# Patient Record
Sex: MALE | Race: WHITE | NOT HISPANIC OR LATINO | Employment: OTHER | URBAN - METROPOLITAN AREA
[De-identification: names, ages, dates, MRNs, and addresses within clinical notes are randomized per-mention and may not be internally consistent; named-entity substitution may affect disease eponyms.]

---

## 2018-01-10 ENCOUNTER — OFFICE VISIT CONVERTED (OUTPATIENT)
Dept: CARDIOLOGY | Facility: CLINIC | Age: 53
End: 2018-01-10
Attending: INTERNAL MEDICINE

## 2018-07-11 ENCOUNTER — OFFICE VISIT CONVERTED (OUTPATIENT)
Dept: CARDIOLOGY | Facility: CLINIC | Age: 53
End: 2018-07-11
Attending: INTERNAL MEDICINE

## 2019-01-16 ENCOUNTER — OFFICE VISIT CONVERTED (OUTPATIENT)
Dept: CARDIOLOGY | Facility: CLINIC | Age: 54
End: 2019-01-16
Attending: INTERNAL MEDICINE

## 2019-02-14 ENCOUNTER — HOSPITAL ENCOUNTER (OUTPATIENT)
Dept: OTHER | Facility: HOSPITAL | Age: 54
Discharge: HOME | End: 2019-02-14
Attending: INTERNAL MEDICINE

## 2019-02-14 LAB
ALBUMIN SERPL-MCNC: 3.8 G/DL (ref 3.5–5)
ALBUMIN/GLOB SERPL: 1.1 {RATIO} (ref 1.4–2.6)
ALP SERPL-CCNC: 114 U/L (ref 56–119)
ALT SERPL-CCNC: 37 U/L (ref 10–40)
ANION GAP SERPL CALC-SCNC: 17 MMOL/L (ref 8–19)
AST SERPL-CCNC: 29 U/L (ref 15–50)
BILIRUB SERPL-MCNC: 0.5 MG/DL (ref 0.2–1.3)
BNP SERPL-MCNC: 64 PG/ML (ref 0–900)
BUN SERPL-MCNC: 15 MG/DL (ref 5–25)
BUN/CREAT SERPL: 13 {RATIO} (ref 6–20)
CALCIUM SERPL-MCNC: 9.1 MG/DL (ref 8.7–10.4)
CHLORIDE SERPL-SCNC: 99 MMOL/L (ref 99–111)
CHOLEST SERPL-MCNC: 162 MG/DL (ref 107–200)
CHOLEST/HDLC SERPL: 4 {RATIO} (ref 3–6)
CONV CO2: 29 MMOL/L (ref 22–32)
CONV TOTAL PROTEIN: 7.4 G/DL (ref 6.3–8.2)
CREAT UR-MCNC: 1.14 MG/DL (ref 0.7–1.2)
GFR SERPLBLD BASED ON 1.73 SQ M-ARVRAT: >60 ML/MIN/{1.73_M2}
GLOBULIN UR ELPH-MCNC: 3.6 G/DL (ref 2–3.5)
GLUCOSE SERPL-MCNC: 118 MG/DL (ref 70–99)
HDLC SERPL-MCNC: 41 MG/DL (ref 40–60)
LDLC SERPL CALC-MCNC: 100 MG/DL (ref 70–100)
OSMOLALITY SERPL CALC.SUM OF ELEC: 294 MOSM/KG (ref 273–304)
POTASSIUM SERPL-SCNC: 3.9 MMOL/L (ref 3.5–5.3)
SODIUM SERPL-SCNC: 141 MMOL/L (ref 135–147)
TRIGL SERPL-MCNC: 104 MG/DL (ref 40–150)
VLDLC SERPL-MCNC: 21 MG/DL (ref 5–37)

## 2019-07-17 ENCOUNTER — OFFICE VISIT CONVERTED (OUTPATIENT)
Dept: CARDIOLOGY | Facility: CLINIC | Age: 54
End: 2019-07-17
Attending: NURSE PRACTITIONER

## 2019-07-17 ENCOUNTER — CONVERSION ENCOUNTER (OUTPATIENT)
Dept: CARDIOLOGY | Facility: CLINIC | Age: 54
End: 2019-07-17

## 2019-08-01 ENCOUNTER — CONVERSION ENCOUNTER (OUTPATIENT)
Dept: CARDIOLOGY | Facility: CLINIC | Age: 54
End: 2019-08-01
Attending: INTERNAL MEDICINE

## 2020-01-20 ENCOUNTER — OFFICE VISIT CONVERTED (OUTPATIENT)
Dept: CARDIOLOGY | Facility: CLINIC | Age: 55
End: 2020-01-20
Attending: INTERNAL MEDICINE

## 2020-01-22 ENCOUNTER — HOSPITAL ENCOUNTER (OUTPATIENT)
Dept: OTHER | Facility: HOSPITAL | Age: 55
Discharge: HOME | End: 2020-01-22
Attending: NURSE PRACTITIONER

## 2020-01-22 LAB
ALBUMIN SERPL-MCNC: 3.8 G/DL (ref 3.5–5)
ALBUMIN/GLOB SERPL: 1.1 {RATIO} (ref 1.4–2.6)
ALP SERPL-CCNC: 110 U/L (ref 56–119)
ALT SERPL-CCNC: 48 U/L (ref 10–40)
ANION GAP SERPL CALC-SCNC: 16 MMOL/L (ref 8–19)
AST SERPL-CCNC: 56 U/L (ref 15–50)
BILIRUB SERPL-MCNC: 0.56 MG/DL (ref 0.2–1.3)
BNP SERPL-MCNC: 90 PG/ML (ref 0–900)
BUN SERPL-MCNC: 10 MG/DL (ref 5–25)
BUN/CREAT SERPL: 10 {RATIO} (ref 6–20)
CALCIUM SERPL-MCNC: 9 MG/DL (ref 8.7–10.4)
CHLORIDE SERPL-SCNC: 101 MMOL/L (ref 99–111)
CHOLEST SERPL-MCNC: 134 MG/DL (ref 107–200)
CHOLEST/HDLC SERPL: 4.5 {RATIO} (ref 3–6)
CONV CO2: 26 MMOL/L (ref 22–32)
CONV TOTAL PROTEIN: 7.4 G/DL (ref 6.3–8.2)
CREAT UR-MCNC: 0.96 MG/DL (ref 0.7–1.2)
GFR SERPLBLD BASED ON 1.73 SQ M-ARVRAT: >60 ML/MIN/{1.73_M2}
GLOBULIN UR ELPH-MCNC: 3.6 G/DL (ref 2–3.5)
GLUCOSE SERPL-MCNC: 155 MG/DL (ref 70–99)
HDLC SERPL-MCNC: 30 MG/DL (ref 40–60)
LDLC SERPL CALC-MCNC: 80 MG/DL (ref 70–100)
OSMOLALITY SERPL CALC.SUM OF ELEC: 288 MOSM/KG (ref 273–304)
POTASSIUM SERPL-SCNC: 4.6 MMOL/L (ref 3.5–5.3)
SODIUM SERPL-SCNC: 138 MMOL/L (ref 135–147)
TRIGL SERPL-MCNC: 121 MG/DL (ref 40–150)
VLDLC SERPL-MCNC: 24 MG/DL (ref 5–37)

## 2020-08-05 ENCOUNTER — TELEPHONE CONVERTED (OUTPATIENT)
Dept: CARDIOLOGY | Facility: CLINIC | Age: 55
End: 2020-08-05
Attending: NURSE PRACTITIONER

## 2020-08-18 ENCOUNTER — HOSPITAL ENCOUNTER (OUTPATIENT)
Dept: OTHER | Facility: HOSPITAL | Age: 55
Discharge: HOME | End: 2020-08-18
Attending: NURSE PRACTITIONER

## 2020-08-18 LAB
ALBUMIN SERPL-MCNC: 4.2 G/DL (ref 3.5–5)
ALBUMIN/GLOB SERPL: 1.1 {RATIO} (ref 1.4–2.6)
ALP SERPL-CCNC: 115 U/L (ref 56–119)
ALT SERPL-CCNC: 41 U/L (ref 10–40)
ANION GAP SERPL CALC-SCNC: 17 MMOL/L (ref 8–19)
AST SERPL-CCNC: 37 U/L (ref 15–50)
BILIRUB SERPL-MCNC: 0.66 MG/DL (ref 0.2–1.3)
BUN SERPL-MCNC: 12 MG/DL (ref 5–25)
BUN/CREAT SERPL: 12 {RATIO} (ref 6–20)
CALCIUM SERPL-MCNC: 10 MG/DL (ref 8.7–10.4)
CHLORIDE SERPL-SCNC: 101 MMOL/L (ref 99–111)
CHOLEST SERPL-MCNC: 108 MG/DL (ref 107–200)
CHOLEST/HDLC SERPL: 3.4 {RATIO} (ref 3–6)
CONV CO2: 27 MMOL/L (ref 22–32)
CONV TOTAL PROTEIN: 7.9 G/DL (ref 6.3–8.2)
CREAT UR-MCNC: 1.04 MG/DL (ref 0.7–1.2)
GFR SERPLBLD BASED ON 1.73 SQ M-ARVRAT: >60 ML/MIN/{1.73_M2}
GLOBULIN UR ELPH-MCNC: 3.7 G/DL (ref 2–3.5)
GLUCOSE SERPL-MCNC: 150 MG/DL (ref 70–99)
HDLC SERPL-MCNC: 32 MG/DL (ref 40–60)
LDLC SERPL CALC-MCNC: 52 MG/DL (ref 70–100)
OSMOLALITY SERPL CALC.SUM OF ELEC: 295 MOSM/KG (ref 273–304)
POTASSIUM SERPL-SCNC: 4.4 MMOL/L (ref 3.5–5.3)
SODIUM SERPL-SCNC: 141 MMOL/L (ref 135–147)
TRIGL SERPL-MCNC: 119 MG/DL (ref 40–150)
VLDLC SERPL-MCNC: 24 MG/DL (ref 5–37)

## 2021-02-08 ENCOUNTER — HOSPITAL ENCOUNTER (OUTPATIENT)
Dept: URGENT CARE | Facility: CLINIC | Age: 56
Discharge: HOME | End: 2021-02-08
Attending: NURSE PRACTITIONER

## 2021-02-22 ENCOUNTER — HOSPITAL ENCOUNTER (OUTPATIENT)
Dept: OTHER | Facility: HOSPITAL | Age: 56
Discharge: HOME | End: 2021-02-22
Attending: NURSE PRACTITIONER

## 2021-02-22 LAB
ALBUMIN SERPL-MCNC: 3.8 G/DL (ref 3.5–5)
ALBUMIN/GLOB SERPL: 1.1 {RATIO} (ref 1.4–2.6)
ALP SERPL-CCNC: 189 U/L (ref 56–119)
ALT SERPL-CCNC: 36 U/L (ref 10–40)
ANION GAP SERPL CALC-SCNC: 15 MMOL/L (ref 8–19)
AST SERPL-CCNC: 27 U/L (ref 15–50)
BILIRUB SERPL-MCNC: 0.61 MG/DL (ref 0.2–1.3)
BUN SERPL-MCNC: 17 MG/DL (ref 5–25)
BUN/CREAT SERPL: 15 {RATIO} (ref 6–20)
CALCIUM SERPL-MCNC: 8.7 MG/DL (ref 8.7–10.4)
CHLORIDE SERPL-SCNC: 98 MMOL/L (ref 99–111)
CHOLEST SERPL-MCNC: 125 MG/DL (ref 107–200)
CHOLEST/HDLC SERPL: 3.9 {RATIO} (ref 3–6)
CONV CO2: 27 MMOL/L (ref 22–32)
CONV TOTAL PROTEIN: 7.2 G/DL (ref 6.3–8.2)
CREAT UR-MCNC: 1.13 MG/DL (ref 0.7–1.2)
GFR SERPLBLD BASED ON 1.73 SQ M-ARVRAT: >60 ML/MIN/{1.73_M2}
GLOBULIN UR ELPH-MCNC: 3.4 G/DL (ref 2–3.5)
GLUCOSE SERPL-MCNC: 361 MG/DL (ref 70–99)
HDLC SERPL-MCNC: 32 MG/DL (ref 40–60)
LDLC SERPL CALC-MCNC: 43 MG/DL (ref 70–100)
OSMOLALITY SERPL CALC.SUM OF ELEC: 298 MOSM/KG (ref 273–304)
POTASSIUM SERPL-SCNC: 4.2 MMOL/L (ref 3.5–5.3)
SODIUM SERPL-SCNC: 136 MMOL/L (ref 135–147)
TRIGL SERPL-MCNC: 250 MG/DL (ref 40–150)
VLDLC SERPL-MCNC: 50 MG/DL (ref 5–37)

## 2021-02-24 ENCOUNTER — HOSPITAL ENCOUNTER (OUTPATIENT)
Dept: URGENT CARE | Facility: CLINIC | Age: 56
Discharge: HOME | End: 2021-02-24
Attending: FAMILY MEDICINE

## 2021-02-24 LAB — GLUCOSE BLD-MCNC: 383 MG/DL (ref 70–99)

## 2021-04-14 ENCOUNTER — OFFICE VISIT CONVERTED (OUTPATIENT)
Dept: CARDIOLOGY | Facility: CLINIC | Age: 56
End: 2021-04-14
Attending: NURSE PRACTITIONER

## 2021-05-10 ENCOUNTER — HOSPITAL ENCOUNTER (OUTPATIENT)
Dept: URGENT CARE | Facility: CLINIC | Age: 56
Discharge: HOME | End: 2021-05-10
Attending: NURSE PRACTITIONER

## 2021-05-13 NOTE — PROGRESS NOTES
Progress Note      Patient Name: Harshil Carpenter   Patient ID: [id number]   Sex: Male   YOB: 1965    Primary Care Provider: Alon Fletcher MD   Referring Provider: Alon Fletcher MD    Visit Date: August 5, 2020    Provider: LISSET Pierce   Location: [facility]   Location Address: [address] [place] [address]   [place]  [id number]   Location Phone: [phone]          History Of Present Illness  TELEHEALTH TELEPHONE VISIT  Chief Complaint: Follow up on coronary artery disease.   Harshil Carpenter is a 54 year old /White male who states his shortness of breath is about the same. He has occasional swelling, but denies any chest pain. No palpitations, dizziness, syncope, PND, or orthopnea. He states he never started the Zetia, but he does continue to take the rosuvastatin. He did not do any labs recently and somewhere along the way he stopped taking his carvedilol because he thought he did not have to take it anymore. He is presenting for evaluation via telehealth telephone visit. Verbal consent obtained before beginning visit.   Provider spent 10 minutes with the patient during the telehealth visit.   The following staff were present during this visit: Provider only.   PAST MEDICAL HISTORY: Coronary artery disease with previous myocardial infarction and stent/PCI in 2016; Hyperlipidemia; Hypertension; Morbid obesity; Nicotine dependence.   FAMILY HISTORY: Positive for diabetes mellitus. Negative for hypertension or heart disease.   PSYCHOSOCIAL HISTORY: Previously smoked, but quit 2-3years ago. Denies alcohol use.   CURRENT MEDICATIONS: Aspirin 81 mg daily; torsemide 20 mg b.i.d.; gabapentin 800 mg t.i.d.; Percocet t.i.d. p.r.n.; rosuvastatin 40 mg daily.       Review of Systems  · Cardiovascular  o Admits  o : swelling (feet, ankles, hands), shortness of breath while walking or lying flat  o Denies  o : palpitations (fast, fluttering, or skipping beats),  chest pain or angina pectoris   · Respiratory  o Denies  o : chronic or frequent cough, asthma or wheezing      Vitals     Patient unable to obtain vitals.       Physical Examination  · Labs  o Labs  o : Not done recently.           Assessment     1.  Coronary artery disease with previous MI and stent without angina.  2.  Hyperlipidemia, unknown control.  3.  Hypertension, unknown control.  4.  Nicotine dependence.       Plan     1.  He states he has not smoked for a long time, so encouraged him to continue to not smoke.  2.  Check a lipid and CMP in the next few weeks.  3.  Restart carvedilol 6.25 mg b.i.d.  4.  Follow up in 6 months with EKG and labs or earlier if needed.      LISSET Mendoza  JF/randee                   Electronically Signed by: Maryan Leija-, Other -Author on August 10, 2020 11:46:23 AM  Electronically Co-signed by: LISSET Pierce -Reviewer on August 11, 2020 11:44:03 AM

## 2021-05-14 VITALS
BODY MASS INDEX: 35.84 KG/M2 | HEART RATE: 72 BPM | DIASTOLIC BLOOD PRESSURE: 88 MMHG | HEIGHT: 69 IN | WEIGHT: 242 LBS | SYSTOLIC BLOOD PRESSURE: 140 MMHG

## 2021-05-14 NOTE — PROGRESS NOTES
Progress Note      Patient Name: Harshil Carpenter   Patient ID: [id number]   Sex: Male   YOB: 1965    Primary Care Provider: Alon Fletcher MD   Referring Provider: Alon Fletcher MD    Visit Date: April 14, 2021    Provider: LISSET Pierce   Location: [facility]   Location Address: [address] [place] [address], [address]   [place], [place]  [id number]   Location Phone: [phone]          Chief Complaint     Pedal edema.       History Of Present Illness  REFERRING PROVIDER: Karolina Gerard [facility]   Harshil Carpenter is a 55 year old /White male who continues to complain of pedal edema. He states he has been noted to have decreased circulation in his legs and he is being monitored by his PCP and also has some vascular studies coming up real soon. He denies any chest pain or pressure. No palpitations, shortness of breath, dizziness, syncope, PND, or orthopnea. He states is a newly diagnosed diabetic and is on Trulicity, unknown dose. He states he is only taking his carvedilol once a day and he does not even know the dose. He states we started it at his last office visit. He has not had his COVID vaccine but intends to get it.   PAST MEDICAL HISTORY: Coronary artery disease with previous myocardial infarction and stent/PCI in 2016; Hyperlipidemia; Hypertension; Morbid obesity; Nicotine dependence.   PSYCHOSOCIAL HISTORY: Denies alcohol use. Previous use of tobacco, but quit.   CURRENT MEDICATIONS: Aspirin 81 mg daily; torsemide 20 mg b.i.d.; gabapentin 800 mg t.i.d.; Percocet t.i.d. p.r.n.; rosuvastatin 40 mg daily; Trulicity unknown dose; carvedilol, unsure of dose but most likely at 6.25 mg, just once a day      ALLERGIES: No known drug allergies.       Review of Systems  · Cardiovascular  o Admits  o : swelling (feet, ankles, hands)  o Denies  o : palpitations (fast, fluttering, or skipping beats), shortness of breath while walking or lying flat, chest pain or angina pectoris  "  · Respiratory  o Denies  o : chronic or frequent cough      Vitals  Date Time BP Position Site L\R Cuff Size HR RR TEMP (F) WT  HT  BMI kg/m2 BSA m2 O2 Sat FR L/min FiO2 HC       04/14/2021 09:15 /88 Sitting    72 - R   241lbs 16oz 5'  9\" 35.74 2.31             Physical Examination  · Constitutional  o Appearance  o : Awake, alert, in no acute distress, obese male.   · Neck  o Jugular Veins  o : No JVD. Good carotid upstroke. No bruits noted.  · Respiratory  o Respiratory  o : Increased AP diameter with diminished breath sounds. Prolonged expiration. Negative rales or rhonchi.  · Cardiovascular  o Heart  o : PMI not well felt. S1, S2 normal. No S3, S4. Negative for systolic/diastolic murmur.   o Peripheral Vascular System  o :   § Extremities  § : Trace pedal edema. Femoral pulse difficult to palpate due to size. Adequate pedal pulses.   · Gastrointestinal  o Abdominal Examination  o : Soft, obese. No hepatosplenomegaly. Abdominal aorta is not palpable.   · Labs  o Labs  o : Done in February 2021: Sugar 361 (before he started Trulicity), triglycerides 250, total cholesterol 125, HDL 32, LDL 43.          Assessment     1.  Coronary artery disease with previous MI and stent, without angina.   2.  Hyperlipidemia, mixed, with LDL at goal.   3.  Hypertension, needs tighter control.   4.  Peripheral vascular disease per history.  5.  Diabetes mellitus, poorly controlled.   6.  Obesity.          Plan     1.  Instructed to take his carvedilol twice a day as previously directed and to do a blood pressure log.   2.  Strongly encouraged him to discuss his diabetes with his PCP regarding a diet consult and when to take his        blood sugars.   3.  Based on his blood pressure logs we will consider adding an ACE and ARB in view of his diabetes.   4.  Will check a lipid panel and CMP in 3 months.   5.  Continue the aspirin in view of his coronary artery disease.   6.  Continue the torsemide in view of his hypertension " and swelling.   7.  Continue rosuvastatin in view of his hyperlipidemia.   8.  Follow up in 6 months or earlier if needed.     LISSET Mendoza  JF/rt                Electronically Signed by: Cristine Foster-, Other -Author on April 20, 2021 12:42:21 PM  Electronically Co-signed by: LISSET Pierce -Reviewer on April 20, 2021 12:55:55 PM

## 2021-05-15 VITALS
HEART RATE: 70 BPM | SYSTOLIC BLOOD PRESSURE: 112 MMHG | HEIGHT: 69 IN | DIASTOLIC BLOOD PRESSURE: 74 MMHG | BODY MASS INDEX: 38.51 KG/M2 | WEIGHT: 260 LBS

## 2021-05-15 VITALS
DIASTOLIC BLOOD PRESSURE: 88 MMHG | BODY MASS INDEX: 38.51 KG/M2 | HEART RATE: 92 BPM | HEIGHT: 69 IN | WEIGHT: 260 LBS | SYSTOLIC BLOOD PRESSURE: 116 MMHG

## 2021-05-15 VITALS
HEIGHT: 69 IN | HEART RATE: 76 BPM | WEIGHT: 249 LBS | DIASTOLIC BLOOD PRESSURE: 60 MMHG | SYSTOLIC BLOOD PRESSURE: 100 MMHG | BODY MASS INDEX: 36.88 KG/M2

## 2021-05-16 VITALS
SYSTOLIC BLOOD PRESSURE: 118 MMHG | WEIGHT: 272 LBS | HEART RATE: 80 BPM | DIASTOLIC BLOOD PRESSURE: 66 MMHG | BODY MASS INDEX: 40.29 KG/M2 | HEIGHT: 69 IN

## 2021-05-16 VITALS
HEIGHT: 69 IN | DIASTOLIC BLOOD PRESSURE: 78 MMHG | WEIGHT: 262 LBS | BODY MASS INDEX: 38.8 KG/M2 | SYSTOLIC BLOOD PRESSURE: 120 MMHG | HEART RATE: 74 BPM

## 2021-08-30 RX ORDER — ROSUVASTATIN CALCIUM 40 MG/1
TABLET, COATED ORAL
Qty: 90 TABLET | Refills: 2 | Status: SHIPPED | OUTPATIENT
Start: 2021-08-30 | End: 2021-10-18 | Stop reason: SDUPTHER

## 2021-08-30 RX ORDER — TORSEMIDE 20 MG/1
TABLET ORAL
Qty: 180 TABLET | Refills: 2 | Status: SHIPPED | OUTPATIENT
Start: 2021-08-30 | End: 2021-10-18 | Stop reason: SDUPTHER

## 2021-10-17 PROBLEM — I10 HYPERTENSION, ESSENTIAL: Chronic | Status: ACTIVE | Noted: 2021-10-17

## 2021-10-17 PROBLEM — F17.219 CIGARETTE NICOTINE DEPENDENCE WITH NICOTINE-INDUCED DISORDER: Status: ACTIVE | Noted: 2021-10-17

## 2021-10-17 PROBLEM — E78.5 HYPERLIPIDEMIA: Chronic | Status: ACTIVE | Noted: 2021-10-17

## 2021-10-17 PROBLEM — I25.10 CORONARY ARTERY DISEASE INVOLVING NATIVE HEART WITHOUT ANGINA PECTORIS: Chronic | Status: ACTIVE | Noted: 2021-10-17

## 2021-10-18 ENCOUNTER — OFFICE VISIT (OUTPATIENT)
Dept: CARDIOLOGY | Facility: CLINIC | Age: 56
End: 2021-10-18

## 2021-10-18 VITALS
WEIGHT: 255 LBS | HEART RATE: 76 BPM | BODY MASS INDEX: 37.77 KG/M2 | DIASTOLIC BLOOD PRESSURE: 76 MMHG | HEIGHT: 69 IN | SYSTOLIC BLOOD PRESSURE: 130 MMHG

## 2021-10-18 DIAGNOSIS — I25.10 CORONARY ARTERY DISEASE INVOLVING NATIVE HEART WITHOUT ANGINA PECTORIS, UNSPECIFIED VESSEL OR LESION TYPE: Primary | Chronic | ICD-10-CM

## 2021-10-18 DIAGNOSIS — E78.5 HYPERLIPIDEMIA, UNSPECIFIED HYPERLIPIDEMIA TYPE: Chronic | ICD-10-CM

## 2021-10-18 DIAGNOSIS — I10 HYPERTENSION, ESSENTIAL: Chronic | ICD-10-CM

## 2021-10-18 DIAGNOSIS — F17.219 CIGARETTE NICOTINE DEPENDENCE WITH NICOTINE-INDUCED DISORDER: ICD-10-CM

## 2021-10-18 PROCEDURE — 99214 OFFICE O/P EST MOD 30 MIN: CPT | Performed by: INTERNAL MEDICINE

## 2021-10-18 RX ORDER — CARVEDILOL 6.25 MG/1
6.25 TABLET ORAL 2 TIMES DAILY
Qty: 180 TABLET | Refills: 2 | Status: SHIPPED | OUTPATIENT
Start: 2021-10-18 | End: 2023-02-21 | Stop reason: SDUPTHER

## 2021-10-18 RX ORDER — GABAPENTIN 800 MG/1
TABLET ORAL
COMMUNITY
Start: 2021-09-28

## 2021-10-18 RX ORDER — ROSUVASTATIN CALCIUM 40 MG/1
40 TABLET, COATED ORAL DAILY
Qty: 90 TABLET | Refills: 2 | Status: SHIPPED | OUTPATIENT
Start: 2021-10-18 | End: 2022-05-27

## 2021-10-18 RX ORDER — OXYCODONE AND ACETAMINOPHEN 7.5; 325 MG/1; MG/1
TABLET ORAL
COMMUNITY
Start: 2021-10-14

## 2021-10-18 RX ORDER — DULAGLUTIDE 1.5 MG/.5ML
INJECTION, SOLUTION SUBCUTANEOUS
COMMUNITY
Start: 2021-09-20 | End: 2023-02-21

## 2021-10-18 RX ORDER — TORSEMIDE 20 MG/1
20 TABLET ORAL 2 TIMES DAILY
Qty: 180 TABLET | Refills: 2 | Status: SHIPPED | OUTPATIENT
Start: 2021-10-18 | End: 2022-05-27

## 2021-10-18 RX ORDER — ASPIRIN 81 MG/1
81 TABLET, COATED ORAL DAILY
COMMUNITY
Start: 2021-09-28

## 2021-10-18 NOTE — ASSESSMENT & PLAN NOTE
Lipid abnormalities are are uncertain control.  He has not got the blood work done that he was supposed to last week.  He will get blood work in the next few days.  In the recent past his lipids are reasonably well controlled except for elevated triglycerides.  If his triglycerides are still elevated will consider adding Vascepa

## 2021-10-18 NOTE — PROGRESS NOTES
Chief Complaint  Coronary Artery Disease, Hypertension, and Hyperlipidemia    Subjective            Harshil Carpenter presents to [facility]  History of Present Illness    Past Medical History:   Diagnosis Date   • CHF (congestive heart failure) (HCC)    • Coronary artery disease involving native heart without angina pectoris 2016    with previous myocardial infarction and stent/PCI in 2016   • Diabetes mellitus (HCC)    • Hyperlipidemia 10/17/2021   • Hypertension, essential 10/17/2021   • Myocardial infarction (HCC)        No Known Allergies     Past Surgical History:   Procedure Laterality Date   • CARDIAC CATHETERIZATION     • CORONARY STENT PLACEMENT          Social History     Tobacco Use   • Smoking status: Former Smoker     Quit date: 10/18/2017     Years since quittin.0   • Smokeless tobacco: Never Used   Vaping Use   • Vaping Use: Never used   Substance Use Topics   • Alcohol use: Never   • Drug use: Defer       History reviewed. No pertinent family history.     Prior to Admission medications    Medication Sig Start Date End Date Taking? Authorizing Provider   Aspirin Low Dose 81 MG EC tablet Take 81 mg by mouth Daily. 21  Yes Provider, Historical, MD   gabapentin (NEURONTIN) 800 MG tablet  21  Yes Provider, MD Brian   oxyCODONE-acetaminophen (PERCOCET) 7.5-325 MG per tablet  10/14/21  Yes Provider, Historical, MD   rosuvastatin (CRESTOR) 40 MG tablet TAKE 1 TABLET(40 MG) BY MOUTH EVERY DAY 21  Yes Aneta Hoover , APRN   Trulicity 1.5 MG/0.5ML solution pen-injector  21  Yes Provider, MD Brian   torsemide (DEMADEX) 20 MG tablet TAKE 1 TABLET BY MOUTH TWICE DAILY 21   Aneta Hoover , APRN        Review of Systems   Constitutional: Negative for fatigue.   Respiratory: Negative for cough and shortness of breath.    Cardiovascular: Positive for leg swelling. Negative for chest pain and palpitations.   Neurological: Negative for  "dizziness.        Objective     /76   Pulse 76   Ht 175.3 cm (69\")   Wt 116 kg (255 lb)   BMI 37.66 kg/m²       Physical Exam      Result Review :                      Lab Results   Component Value Date    BNP 90 01/22/2020    BNP 64 02/14/2019     CMP    CMP 2/22/21   Glucose 361 (A)   BUN 17   Creatinine 1.13   Sodium 136   Potassium 4.2   Chloride 98 (A)   Calcium 8.7   Albumin 3.8   Total Bilirubin 0.61   Alkaline Phosphatase 189 (A)   AST (SGOT) 27   ALT (SGPT) 36   (A) Abnormal value       Comments are available for some flowsheets but are not being displayed.              Lipid Panel    Lipid Panel 2/22/21   Total Cholesterol 125   Triglycerides 250 (A)   HDL Cholesterol 32 (A)   VLDL Cholesterol 50 (A)   LDL Cholesterol  43 (A)   (A) Abnormal value       Comments are available for some flowsheets but are not being displayed.            No results found for: TSH   No results found for: FREET4   No results found for: DDIMERQUANT  No results found for: MG   No results found for: DIGOXIN               Assessment and Plan        Diagnoses and all orders for this visit:    1. Coronary artery disease involving native heart without angina pectoris, unspecified vessel or lesion type (Primary)  Assessment & Plan:  Coronary artery disease is stable.  He has not had any episodes of angina in the last 6 months.  He ran out of his carvedilol but did not call us or come to the office to get a new prescription and he has been off the medication for the last month or 2.  I have emphasized to him to take his medications regularly and pointed out to us if his going to run out of his medications before his office visit    Orders:  -     Comprehensive Metabolic Panel; Future  -     Lipid Panel; Future  -     Hemoglobin A1c; Future  -     Lipid Panel; Future  -     Comprehensive Metabolic Panel; Future  -     Magnesium; Future    2. Hyperlipidemia, unspecified hyperlipidemia type  Assessment & Plan:  Lipid abnormalities " are are uncertain control.  He has not got the blood work done that he was supposed to last week.  He will get blood work in the next few days.  In the recent past his lipids are reasonably well controlled except for elevated triglycerides.  If his triglycerides are still elevated will consider adding Vascepa    Orders:  -     Comprehensive Metabolic Panel; Future  -     Lipid Panel; Future  -     Hemoglobin A1c; Future  -     Lipid Panel; Future  -     Comprehensive Metabolic Panel; Future  -     Magnesium; Future    3. Hypertension, essential  -     Comprehensive Metabolic Panel; Future  -     Lipid Panel; Future  -     Hemoglobin A1c; Future  -     Lipid Panel; Future  -     Comprehensive Metabolic Panel; Future  -     Magnesium; Future    4. Cigarette nicotine dependence with nicotine-induced disorder  Assessment & Plan:  Tobacco use: Mr. Carpenter is finally stopped smoking.  Have strongly urged him not to consider smoking at all and refrain from cigarettes or any form of nicotine.  I complemented him on his being able to stop smoking cigarettes    Orders:  -     Comprehensive Metabolic Panel; Future  -     Lipid Panel; Future  -     Hemoglobin A1c; Future  -     Lipid Panel; Future  -     Comprehensive Metabolic Panel; Future  -     Magnesium; Future    Other orders  -     torsemide (DEMADEX) 20 MG tablet; Take 1 tablet by mouth 2 (Two) Times a Day.  Dispense: 180 tablet; Refill: 2  -     rosuvastatin (CRESTOR) 40 MG tablet; Take 1 tablet by mouth Daily.  Dispense: 90 tablet; Refill: 2  -     carvedilol (COREG) 6.25 MG tablet; Take 1 tablet by mouth 2 (Two) Times a Day.  Dispense: 180 tablet; Refill: 2          Follow Up     Return in about 6 months (around 4/18/2022) for With Susan.    Patient was given instructions and counseling regarding his condition or for health maintenance advice. Please see specific information pulled into the AVS if appropriate.

## 2021-10-18 NOTE — ASSESSMENT & PLAN NOTE
Coronary artery disease is stable.  He has not had any episodes of angina in the last 6 months.  He ran out of his carvedilol but did not call us or come to the office to get a new prescription and he has been off the medication for the last month or 2.  I have emphasized to him to take his medications regularly and pointed out to us if his going to run out of his medications before his office visit

## 2021-10-18 NOTE — ASSESSMENT & PLAN NOTE
Tobacco use: Mr. Carpenter is finally stopped smoking.  Have strongly urged him not to consider smoking at all and refrain from cigarettes or any form of nicotine.  I complemented him on his being able to stop smoking cigarettes

## 2021-10-20 ENCOUNTER — LAB (OUTPATIENT)
Dept: LAB | Facility: HOSPITAL | Age: 56
End: 2021-10-20

## 2021-10-20 DIAGNOSIS — I10 HYPERTENSION, ESSENTIAL: Chronic | ICD-10-CM

## 2021-10-20 DIAGNOSIS — E78.5 HYPERLIPIDEMIA, UNSPECIFIED HYPERLIPIDEMIA TYPE: Chronic | ICD-10-CM

## 2021-10-20 DIAGNOSIS — F17.219 CIGARETTE NICOTINE DEPENDENCE WITH NICOTINE-INDUCED DISORDER: ICD-10-CM

## 2021-10-20 DIAGNOSIS — I25.10 CORONARY ARTERY DISEASE INVOLVING NATIVE HEART WITHOUT ANGINA PECTORIS, UNSPECIFIED VESSEL OR LESION TYPE: Chronic | ICD-10-CM

## 2021-10-20 LAB
ALBUMIN SERPL-MCNC: 4 G/DL (ref 3.5–5.2)
ALBUMIN/GLOB SERPL: 1.3 G/DL
ALP SERPL-CCNC: 96 U/L (ref 39–117)
ALT SERPL W P-5'-P-CCNC: 29 U/L (ref 1–41)
ANION GAP SERPL CALCULATED.3IONS-SCNC: 11.5 MMOL/L (ref 5–15)
AST SERPL-CCNC: 31 U/L (ref 1–40)
BILIRUB SERPL-MCNC: 0.4 MG/DL (ref 0–1.2)
BUN SERPL-MCNC: 13 MG/DL (ref 6–20)
BUN/CREAT SERPL: 14.1 (ref 7–25)
CALCIUM SPEC-SCNC: 8.7 MG/DL (ref 8.6–10.5)
CHLORIDE SERPL-SCNC: 104 MMOL/L (ref 98–107)
CHOLEST SERPL-MCNC: 85 MG/DL (ref 0–200)
CO2 SERPL-SCNC: 22.5 MMOL/L (ref 22–29)
CREAT SERPL-MCNC: 0.92 MG/DL (ref 0.76–1.27)
GFR SERPL CREATININE-BSD FRML MDRD: 85 ML/MIN/1.73
GLOBULIN UR ELPH-MCNC: 3 GM/DL
GLUCOSE SERPL-MCNC: 111 MG/DL (ref 65–99)
HBA1C MFR BLD: 7.31 % (ref 4.8–5.6)
HDLC SERPL-MCNC: 35 MG/DL (ref 40–60)
LDLC SERPL CALC-MCNC: 35 MG/DL (ref 0–100)
LDLC/HDLC SERPL: 1.05 {RATIO}
POTASSIUM SERPL-SCNC: 4.7 MMOL/L (ref 3.5–5.2)
PROT SERPL-MCNC: 7 G/DL (ref 6–8.5)
SODIUM SERPL-SCNC: 138 MMOL/L (ref 136–145)
TRIGL SERPL-MCNC: 67 MG/DL (ref 0–150)
VLDLC SERPL-MCNC: 15 MG/DL (ref 5–40)

## 2021-10-20 PROCEDURE — 80053 COMPREHEN METABOLIC PANEL: CPT

## 2021-10-20 PROCEDURE — 36415 COLL VENOUS BLD VENIPUNCTURE: CPT

## 2021-10-20 PROCEDURE — 83036 HEMOGLOBIN GLYCOSYLATED A1C: CPT

## 2021-10-20 PROCEDURE — 80061 LIPID PANEL: CPT

## 2022-04-20 ENCOUNTER — OFFICE VISIT (OUTPATIENT)
Dept: VASCULAR SURGERY | Facility: HOSPITAL | Age: 57
End: 2022-04-20

## 2022-04-20 VITALS
HEART RATE: 72 BPM | SYSTOLIC BLOOD PRESSURE: 122 MMHG | RESPIRATION RATE: 18 BRPM | DIASTOLIC BLOOD PRESSURE: 76 MMHG | OXYGEN SATURATION: 97 % | TEMPERATURE: 98 F

## 2022-04-20 DIAGNOSIS — I87.2 VENOUS (PERIPHERAL) INSUFFICIENCY: Primary | ICD-10-CM

## 2022-04-20 PROCEDURE — 99213 OFFICE O/P EST LOW 20 MIN: CPT | Performed by: NURSE PRACTITIONER

## 2022-04-20 PROCEDURE — G0463 HOSPITAL OUTPT CLINIC VISIT: HCPCS | Performed by: NURSE PRACTITIONER

## 2022-04-20 NOTE — PROGRESS NOTES
[facility] New Patient Office Note     Date of Encounter: 04/20/2022     MRN Number: [id number]  Name: Harshil Carpenter  Phone Number: [phone]     Referred By: Barrington Leija APRN  PCP: Nelly Agarwal MD    Chief Complaint:    Chief Complaint   Patient presents with   • Leg Swelling     PATIENT IS HERE AS A REFERRAL FROM PCP TO DISCUSS POOR CIRCULATION AND LEG PAIN.        Subjective      History of Present Illness:    Harshil Carpenter is a 56 y.o. male presents with bilateral and bilateral lower extremity pain and swelling.  He has scattered varicosities and mild hyperpigmentation.  He has worn compression stockings in the past but is unable to apply them and wear them for any extended period of time.  He explains that the swelling and the pain are worse when his feet are dangling.  He also complains of tingling and numbness.  He says he was told that we could remove the veins take away his pain.  He is a former smoker who states he quit in 2017.    Review of Systems:  Review of Systems   Constitutional: Negative.   HENT: Negative.    Cardiovascular: Negative.    Respiratory: Negative.    Skin:  Bilateral lower extremities: Pain, swelling numbness/tingling.    Musculoskeletal: Negative.    Gastrointestinal: Negative.    Neurological: Negative.    Psychiatric/Behavioral: Negative.      I have reviewed the following portions of the patient's history: allergies, current medications, past family history, past medical history, past social history, past surgical history and problem list and confirm it's accurate.    Allergies:  No Known Allergies    Medications:      Current Outpatient Medications:   •  Aspirin Low Dose 81 MG EC tablet, Take 81 mg by mouth Daily., Disp: , Rfl:   •  carvedilol (COREG) 6.25 MG tablet, Take 1 tablet by mouth 2 (Two) Times a Day., Disp: 180 tablet, Rfl: 2  •  FREESTYLE LITE test strip, , Disp: , Rfl:   •  gabapentin (NEURONTIN) 800 MG tablet, ,  Disp: , Rfl:   •  glucose blood (FREESTYLE LITE) test strip, USE TO TEST EVERY MORNING BEFORE BREAKFAST, Disp: , Rfl:   •  HYDROcodone-acetaminophen (NORCO)  MG per tablet, Norco  mg oral tablet take 1 tablet by oral route every 6 hours as needed for pain   Active, Disp: , Rfl:   •  Lancets (freestyle) lancets, , Disp: , Rfl:   •  oxyCODONE-acetaminophen (PERCOCET) 7.5-325 MG per tablet, , Disp: , Rfl:   •  rosuvastatin (CRESTOR) 40 MG tablet, Take 1 tablet by mouth Daily., Disp: 90 tablet, Rfl: 2  •  torsemide (DEMADEX) 20 MG tablet, Take 1 tablet by mouth 2 (Two) Times a Day., Disp: 180 tablet, Rfl: 2  •  Trulicity 1.5 MG/0.5ML solution pen-injector, , Disp: , Rfl:   •  fluticasone (FLONASE) 50 MCG/ACT nasal spray, 2 sprays into the nostril(s) as directed by provider Daily for 14 days., Disp: 16 g, Rfl: 0    History:   Past Medical History:   Diagnosis Date   • CHF (congestive heart failure) (HCC)    • Coronary artery disease involving native heart without angina pectoris 2016    with previous myocardial infarction and stent/PCI in 2016   • Diabetes mellitus (HCC)    • Hyperlipidemia 10/17/2021   • Hypertension, essential 10/17/2021   • Myocardial infarction (HCC)        Past Surgical History:   Procedure Laterality Date   • CARDIAC CATHETERIZATION     • CORONARY STENT PLACEMENT         Social History     Socioeconomic History   • Marital status: Single   Tobacco Use   • Smoking status: Former Smoker     Quit date: 10/18/2017     Years since quittin.5   • Smokeless tobacco: Never Used   • Tobacco comment: CONTINUES WITH SMPKING CESSATION   Vaping Use   • Vaping Use: Never used   Substance and Sexual Activity   • Alcohol use: Never   • Drug use: Defer   • Sexual activity: Defer        History reviewed. No pertinent family history.    Objective     Physical Exam:  Vitals:    22 1306   BP: 122/76   BP Location: Left arm   Patient Position: Sitting   Cuff Size: Large Adult   Pulse: 72   Resp:  18   Temp: 98 °F (36.7 °C)   TempSrc: Temporal   SpO2: 97%   PainSc:   7   PainLoc: Leg      There is no height or weight on file to calculate BMI.    Physical Exam  Physical Exam  Constitutional:       Appearance: Normal appearance.   HENT:      Head: Normocephalic.   Cardiovascular:      Rate and Rhythm: Normal rate.      Pulses: Normal pulses.      Comments: Bilateral lower extremities: +2 palpable dorsalis pedis and posterior tibial pulses.  Pulmonary:      Effort: Pulmonary effort is normal.   Musculoskeletal:         General: Normal range of motion.      Cervical back: Normal range of motion.   Skin:     General: Skin is warm and dry.      Capillary Refill: Capillary refill takes less than 2 seconds.      Comments: Bilateral lower extremities: varicosities, mild hyperpigmentation and nonpitting edema.   Neurological:      General: No focal deficit present.      Mental Status: She is alert and oriented to person, place, and time.   Psychiatric:         Mood and Affect: Mood normal.         Behavior: Behavior normal.    Imaging/Labs:  No radiology results for the last 30 days.   I have reviewed the arterial doppler that was performed at [facility] in May of 2021.  The ABIs were 1.04 on the right and 1.17 on the left with triphasic waveforms.    Assessment / Plan      Assessment / Plan:  Diagnoses and all orders for this visit:    1. Venous (peripheral) insufficiency (Primary)  -     Compression Stockings        Mr. Carpenter physical exam is consistant with venous insuffiency however, some of the symptoms he describes are more consistant with neuropathy. He does have varcosities, swelling and mild hyperpigmentation with no open ulcers. I have explained in great detail that we should start with conservative therapy by using compression. I have written a script for circaid compression since he has difficulty with applying the impression stockings. I have advised that circaid compression garments and stockings are not  normally covered by insurance.  I have also advised we do not normally do rene stripping and ablations at our facility.  He has requested and we will provide him with the contact information of the [facility] in [place]. He may follow up as needed.     Patient Education: Compression therapy 20-30 mm/hg    Follow Up:   No follow-ups on file.   Or sooner for any further concerns or worsening sign and symptoms. If unable to reach us in the office please dial 911 or go to the nearest emergency department.      LISSET Wilson  [facility]

## 2022-05-06 ENCOUNTER — LAB (OUTPATIENT)
Dept: LAB | Facility: HOSPITAL | Age: 57
End: 2022-05-06

## 2022-05-06 DIAGNOSIS — I10 HYPERTENSION, ESSENTIAL: Chronic | ICD-10-CM

## 2022-05-06 DIAGNOSIS — E78.5 HYPERLIPIDEMIA, UNSPECIFIED HYPERLIPIDEMIA TYPE: Chronic | ICD-10-CM

## 2022-05-06 DIAGNOSIS — F17.219 CIGARETTE NICOTINE DEPENDENCE WITH NICOTINE-INDUCED DISORDER: ICD-10-CM

## 2022-05-06 DIAGNOSIS — I25.10 CORONARY ARTERY DISEASE INVOLVING NATIVE HEART WITHOUT ANGINA PECTORIS, UNSPECIFIED VESSEL OR LESION TYPE: Chronic | ICD-10-CM

## 2022-05-06 LAB
ALBUMIN SERPL-MCNC: 4.2 G/DL (ref 3.5–5.2)
ALBUMIN/GLOB SERPL: 1.4 G/DL
ALP SERPL-CCNC: 102 U/L (ref 39–117)
ALT SERPL W P-5'-P-CCNC: 35 U/L (ref 1–41)
ANION GAP SERPL CALCULATED.3IONS-SCNC: 11 MMOL/L (ref 5–15)
AST SERPL-CCNC: 33 U/L (ref 1–40)
BILIRUB SERPL-MCNC: 0.6 MG/DL (ref 0–1.2)
BUN SERPL-MCNC: 13 MG/DL (ref 6–20)
BUN/CREAT SERPL: 14.1 (ref 7–25)
CALCIUM SPEC-SCNC: 8.8 MG/DL (ref 8.6–10.5)
CHLORIDE SERPL-SCNC: 103 MMOL/L (ref 98–107)
CHOLEST SERPL-MCNC: 150 MG/DL (ref 0–200)
CO2 SERPL-SCNC: 25 MMOL/L (ref 22–29)
CREAT SERPL-MCNC: 0.92 MG/DL (ref 0.76–1.27)
EGFRCR SERPLBLD CKD-EPI 2021: 97 ML/MIN/1.73
GLOBULIN UR ELPH-MCNC: 2.9 GM/DL
GLUCOSE SERPL-MCNC: 181 MG/DL (ref 65–99)
HDLC SERPL-MCNC: 39 MG/DL (ref 40–60)
LDLC SERPL CALC-MCNC: 92 MG/DL (ref 0–100)
LDLC/HDLC SERPL: 2.33 {RATIO}
MAGNESIUM SERPL-MCNC: 1.8 MG/DL (ref 1.6–2.6)
POTASSIUM SERPL-SCNC: 4.4 MMOL/L (ref 3.5–5.2)
PROT SERPL-MCNC: 7.1 G/DL (ref 6–8.5)
SODIUM SERPL-SCNC: 139 MMOL/L (ref 136–145)
TRIGL SERPL-MCNC: 100 MG/DL (ref 0–150)
VLDLC SERPL-MCNC: 19 MG/DL (ref 5–40)

## 2022-05-06 PROCEDURE — 80053 COMPREHEN METABOLIC PANEL: CPT

## 2022-05-06 PROCEDURE — 83735 ASSAY OF MAGNESIUM: CPT

## 2022-05-06 PROCEDURE — 36415 COLL VENOUS BLD VENIPUNCTURE: CPT

## 2022-05-06 PROCEDURE — 80061 LIPID PANEL: CPT

## 2022-05-27 RX ORDER — ROSUVASTATIN CALCIUM 40 MG/1
TABLET, COATED ORAL
Qty: 90 TABLET | Refills: 2 | Status: SHIPPED | OUTPATIENT
Start: 2022-05-27

## 2022-05-27 RX ORDER — TORSEMIDE 20 MG/1
TABLET ORAL
Qty: 180 TABLET | Refills: 2 | Status: SHIPPED | OUTPATIENT
Start: 2022-05-27

## 2022-07-20 PROCEDURE — 87205 SMEAR GRAM STAIN: CPT | Performed by: NURSE PRACTITIONER

## 2022-07-20 PROCEDURE — 87147 CULTURE TYPE IMMUNOLOGIC: CPT | Performed by: NURSE PRACTITIONER

## 2022-07-20 PROCEDURE — 87186 SC STD MICRODIL/AGAR DIL: CPT | Performed by: NURSE PRACTITIONER

## 2022-07-20 PROCEDURE — 87070 CULTURE OTHR SPECIMN AEROBIC: CPT | Performed by: NURSE PRACTITIONER

## 2022-07-22 ENCOUNTER — TELEPHONE (OUTPATIENT)
Dept: URGENT CARE | Facility: CLINIC | Age: 57
End: 2022-07-22

## 2022-07-22 NOTE — TELEPHONE ENCOUNTER
Left message for patient to call back re results of culture.  Treatment adjustment indicated with addition of azithromycin.

## 2022-07-23 ENCOUNTER — TELEPHONE (OUTPATIENT)
Dept: URGENT CARE | Facility: CLINIC | Age: 57
End: 2022-07-23

## 2022-07-23 DIAGNOSIS — L03.031 INFECTION OF NAIL BED OF TOE OF RIGHT FOOT: Primary | ICD-10-CM

## 2022-07-23 RX ORDER — AZITHROMYCIN 250 MG/1
TABLET, FILM COATED ORAL
Qty: 6 TABLET | Refills: 0 | Status: SHIPPED | OUTPATIENT
Start: 2022-07-23 | End: 2023-02-21

## 2022-11-22 RX ORDER — TORSEMIDE 20 MG/1
TABLET ORAL
Qty: 180 TABLET | Refills: 2 | OUTPATIENT
Start: 2022-11-22

## 2022-11-22 RX ORDER — ROSUVASTATIN CALCIUM 40 MG/1
TABLET, COATED ORAL
Qty: 90 TABLET | Refills: 2 | OUTPATIENT
Start: 2022-11-22

## 2023-02-21 ENCOUNTER — OFFICE VISIT (OUTPATIENT)
Dept: CARDIOLOGY | Facility: CLINIC | Age: 58
End: 2023-02-21
Payer: MEDICARE

## 2023-02-21 VITALS
DIASTOLIC BLOOD PRESSURE: 74 MMHG | WEIGHT: 235 LBS | BODY MASS INDEX: 34.8 KG/M2 | HEART RATE: 79 BPM | HEIGHT: 69 IN | SYSTOLIC BLOOD PRESSURE: 134 MMHG

## 2023-02-21 DIAGNOSIS — I25.10 CORONARY ARTERY DISEASE INVOLVING NATIVE CORONARY ARTERY OF NATIVE HEART WITHOUT ANGINA PECTORIS: Primary | Chronic | ICD-10-CM

## 2023-02-21 DIAGNOSIS — I10 HYPERTENSION, ESSENTIAL: Chronic | ICD-10-CM

## 2023-02-21 DIAGNOSIS — E78.2 MIXED HYPERLIPIDEMIA: Chronic | ICD-10-CM

## 2023-02-21 PROBLEM — E11.9 TYPE 2 DIABETES MELLITUS (HCC): Status: ACTIVE | Noted: 2017-07-09

## 2023-02-21 PROBLEM — F17.219 CIGARETTE NICOTINE DEPENDENCE WITH NICOTINE-INDUCED DISORDER: Status: RESOLVED | Noted: 2021-10-17 | Resolved: 2023-02-21

## 2023-02-21 PROCEDURE — 99214 OFFICE O/P EST MOD 30 MIN: CPT | Performed by: NURSE PRACTITIONER

## 2023-02-21 RX ORDER — DULAGLUTIDE 4.5 MG/.5ML
4.5 INJECTION, SOLUTION SUBCUTANEOUS DAILY
COMMUNITY

## 2023-02-21 RX ORDER — FLUOCINOLONE ACETONIDE 0.11 MG/ML
5 OIL AURICULAR (OTIC) 2 TIMES DAILY
COMMUNITY
Start: 2023-02-14

## 2023-02-21 RX ORDER — CARVEDILOL 6.25 MG/1
6.25 TABLET ORAL 2 TIMES DAILY
Qty: 180 TABLET | Refills: 3 | Status: SHIPPED | OUTPATIENT
Start: 2023-02-21

## 2023-02-21 NOTE — PROGRESS NOTES
Chief Complaint  Follow-up, Coronary Artery Disease, Hyperlipidemia, and Hypertension    Subjective            History of Present Illness  Harshil Carpenter is a 57-year-old white/ male patient who presents to the office today for follow-up.  He has CAD, hypertension, hyperlipidemia, and is a former smoker.  Since his last visit, he has adopted a new lifestyle which has helped him to lose about 20 pounds.  He feels much better and is able to be more physically active than he has been in the past.  He denies any chest pain, shortness of breath, lightheadedness/dizziness, palpitations, or edema.    [facility]  Past Medical History:   Diagnosis Date   • CHF (congestive heart failure) ([facility])    • Coronary artery disease involving native heart without angina pectoris 2016    with previous myocardial infarction and stent/PCI in 2016   • Diabetes mellitus ([facility])    • Hyperlipidemia 10/17/2021   • Hypertension, essential 10/17/2021   • Myocardial infarction ([facility])          ALLERGY  Allergies   Allergen Reactions   • Amoxicillin-Pot Clavulanate Swelling          SURGICALHX  Past Surgical History:   Procedure Laterality Date   • CARDIAC CATHETERIZATION     • CORONARY STENT PLACEMENT            SOC  Social History     Socioeconomic History   • Marital status: Single   Tobacco Use   • Smoking status: Former     Types: Cigarettes     Quit date: 10/18/2017     Years since quittin.3   • Smokeless tobacco: Never   • Tobacco comments:     CONTINUES WITH SMPKING CESSATION   Vaping Use   • Vaping Use: Never used   Substance and Sexual Activity   • Alcohol use: Never   • Drug use: Defer   • Sexual activity: Defer         FAMHX  History reviewed. No pertinent family history.       MEDSIGONLY  Current Outpatient Medications on File Prior to Visit   Medication Sig   • Aspirin Low Dose 81 MG EC tablet Take 81 mg by mouth Daily.   • Diclofenac Sodium (VOLTAREN) 1 % gel gel Voltaren Arthritis Pain 1 % topical gel   APPLY 2 GRAMS  "TOPICALLY TO THE AFFECTED AREA FOUR TIMES DAILY AS NEEDED   • Dulaglutide (Trulicity) 4.5 MG/0.5ML solution pen-injector Inject 4.5 mg under the skin into the appropriate area as directed Daily.   • fluocinolone acetonide (DERMOTIC) 0.01 % oil otic oil Administer 5 each into both ears 2 (Two) Times a Day.   • fluticasone (FLONASE) 50 MCG/ACT nasal spray 2 sprays into the nostril(s) as directed by provider Daily.   • FREESTYLE LITE test strip    • gabapentin (NEURONTIN) 800 MG tablet    • glucose blood (FREESTYLE LITE) test strip USE TO TEST EVERY MORNING BEFORE BREAKFAST   • Lancets (freestyle) lancets    • oxyCODONE-acetaminophen (PERCOCET) 7.5-325 MG per tablet    • rosuvastatin (CRESTOR) 40 MG tablet TAKE 1 TABLET(40 MG) BY MOUTH EVERY DAY   • torsemide (DEMADEX) 20 MG tablet TAKE 1 TABLET BY MOUTH TWICE DAILY   • [DISCONTINUED] carvedilol (COREG) 6.25 MG tablet Take 1 tablet by mouth 2 (Two) Times a Day.   • [DISCONTINUED] azithromycin (Zithromax Z-Flex) 250 MG tablet Take 2 PO today then take 1 daily on days 2-5   • [DISCONTINUED] Dulaglutide (Trulicity) 3 MG/0.5ML solution pen-injector Trulicity 3 mg/0.5 mL subcutaneous pen injector   INJECT 3MG UNDER THE SKIN ONCE WEEKLY   • [DISCONTINUED] Trulicity 1.5 MG/0.5ML solution pen-injector    • [DISCONTINUED] Trulicity 3 MG/0.5ML solution pen-injector      No current facility-administered medications on file prior to visit.         Objective   /74   Pulse 79   Ht 175.3 cm (69\")   Wt 107 kg (235 lb)   BMI 34.70 kg/m²       Physical Exam  HENT:      Head: Normocephalic.   Neck:      Vascular: No carotid bruit.   Cardiovascular:      Rate and Rhythm: Normal rate and regular rhythm.      Pulses: Normal pulses.      Heart sounds: Normal heart sounds. No murmur heard.  Pulmonary:      Effort: Pulmonary effort is normal.      Breath sounds: Normal breath sounds.   Musculoskeletal:      Cervical back: Neck supple.      Right lower leg: No edema.      Left lower " leg: No edema.   Skin:     General: Skin is dry.      Capillary Refill: Capillary refill takes less than 2 seconds.   Neurological:      Mental Status: He is alert and oriented to person, place, and time.   Psychiatric:         Behavior: Behavior normal.       Result Review :   The following data was reviewed by: LISSET Buchanan on 02/21/2023:  No results found for: PROBNP  CMP    CMP 5/6/22   Glucose 181 (A)   BUN 13   Creatinine 0.92   eGFR 97.0   Sodium 139   Potassium 4.4   Chloride 103   Calcium 8.8   Total Protein 7.1   Albumin 4.20   Globulin 2.9   Total Bilirubin 0.6   Alkaline Phosphatase 102   AST (SGOT) 33   ALT (SGPT) 35   Albumin/Globulin Ratio 1.4   BUN/Creatinine Ratio 14.1   Anion Gap 11.0   (A) Abnormal value       Comments are available for some flowsheets but are not being displayed.              No results found for: TSH   No results found for: FREET4   No results found for: DDIMERQUANT  Magnesium   Date Value Ref Range Status   05/06/2022 1.8 1.6 - 2.6 mg/dL Final      No results found for: DIGOXIN   No results found for: TROPONINT        Lipid Panel    Lipid Panel 5/6/22   Total Cholesterol 150   Triglycerides 100   HDL Cholesterol 39 (A)   VLDL Cholesterol 19   LDL Cholesterol  92   LDL/HDL Ratio 2.33   (A) Abnormal value                 Assessment and Plan    Diagnoses and all orders for this visit:    1. CAD (Primary)  He denies any anginal symptoms, continue aspirin 81 mg daily.    2. Hypertension, essential  Currently controlled without adverse effect from medication, continue carvedilol 6.25 mg twice daily and torsemide 20 mg twice daily.  Low-sodium diet discussed.    3. Mixed hyperlipidemia  Last lipid panel which is available for review was done on 5/6/2022 with LDL 92 however he reports that he has had new labs done with PCP more recently.  We will request most recent labs and will determine if rosuvastatin 40 mg dose needs to be changed.      Other orders  -     carvedilol  (COREG) 6.25 MG tablet; Take 1 tablet by mouth 2 (Two) Times a Day.  Dispense: 180 tablet; Refill: 3            Follow Up   Return in about 1 year (around 2/21/2024) for Follow up with Dr Camarillo.    Patient was given instructions and counseling regarding his condition or for health maintenance advice. Please see specific information pulled into the AVS if appropriate.     Harshil CADENA Jayden  reports that he quit smoking about 5 years ago. His smoking use included cigarettes. He has never used smokeless tobacco.           Jessa Lizarraga, APRN  02/21/23  13:22 EST    Dictated Utilizing Dragon Dictation

## 2023-07-27 ENCOUNTER — TELEPHONE (OUTPATIENT)
Dept: CARDIOLOGY | Facility: CLINIC | Age: 58
End: 2023-07-27
Payer: MEDICARE

## 2023-07-27 RX ORDER — ROSUVASTATIN CALCIUM 40 MG/1
40 TABLET, COATED ORAL DAILY
Qty: 90 TABLET | Refills: 1 | Status: SHIPPED | OUTPATIENT
Start: 2023-07-27

## 2023-07-27 NOTE — TELEPHONE ENCOUNTER
The [facility] received a fax that requires your attention. The document has been indexed to the patient’s chart for your review.      Reason for sending: EXTERNAL MEDICAL RECORD NOTIFICATION     Documents Description: ROSUVASTATIN REFILL     Name of Sender: VALENCIA     Date Indexed: 7.27.23

## 2023-08-21 ENCOUNTER — TELEPHONE (OUTPATIENT)
Dept: CARDIOLOGY | Facility: CLINIC | Age: 58
End: 2023-08-21
Payer: MEDICARE

## 2023-08-21 RX ORDER — TORSEMIDE 20 MG/1
20 TABLET ORAL 2 TIMES DAILY
Qty: 180 TABLET | Refills: 1 | Status: SHIPPED | OUTPATIENT
Start: 2023-08-21

## 2023-08-21 NOTE — TELEPHONE ENCOUNTER
The [facility] received a fax that requires your attention. The document has been indexed to the patient's chart for your review.      Reason for sending: EXTERNAL MEDICAL RECORD NOTIFICATION     Documents Description: EXTMEDREC-TORSEMIDE REFILL-8.21.23    Name of Sender: [employer]     Date Indexed: 8.21.23

## 2023-10-12 ENCOUNTER — TELEPHONE (OUTPATIENT)
Dept: CARDIOLOGY | Facility: CLINIC | Age: 58
End: 2023-10-12
Payer: MEDICARE

## 2023-10-12 NOTE — TELEPHONE ENCOUNTER
The [facility] received a fax that requires your attention. The document has been indexed to the patient's chart for your review.      Reason for sending: EXTERNAL MEDICAL RECORD NOTIFICATION     Documents Description: MEDS-POTENTIAL ADR ANTHEM-10.12.23    Name of Sender: PAIR     Date Indexed: 10.12.23

## 2024-02-19 RX ORDER — TORSEMIDE 20 MG/1
20 TABLET ORAL 2 TIMES DAILY
Qty: 180 TABLET | Refills: 1 | Status: SHIPPED | OUTPATIENT
Start: 2024-02-19

## 2024-06-14 RX ORDER — TORSEMIDE 20 MG/1
20 TABLET ORAL 2 TIMES DAILY
Qty: 180 TABLET | Refills: 1 | OUTPATIENT
Start: 2024-06-14

## 2024-07-22 ENCOUNTER — TELEPHONE (OUTPATIENT)
Dept: CARDIOLOGY | Facility: CLINIC | Age: 59
End: 2024-07-22
Payer: COMMERCIAL

## 2024-07-22 NOTE — TELEPHONE ENCOUNTER
The [facility] received a fax that requires your attention. The document has been indexed to the patient’s chart for your review.      Reason for sending: EXTERNAL MEDICAL RECORD NOTIFICATION     Documents Description: CARDIAC CLEARANCE REQ-[facility] ACCESS [place] COLORECTAL SCREENING-7.22.24    Name of Sender: [facility] COLORECTAL SCREENING     Date Indexed: 7.22.24

## 2024-11-07 ENCOUNTER — OFFICE VISIT (OUTPATIENT)
Dept: CARDIOLOGY | Facility: CLINIC | Age: 59
End: 2024-11-07
Payer: MEDICARE

## 2024-11-07 VITALS
SYSTOLIC BLOOD PRESSURE: 133 MMHG | HEART RATE: 73 BPM | DIASTOLIC BLOOD PRESSURE: 79 MMHG | BODY MASS INDEX: 29.47 KG/M2 | WEIGHT: 199 LBS | HEIGHT: 69 IN

## 2024-11-07 DIAGNOSIS — I50.32 CHRONIC HEART FAILURE WITH PRESERVED EJECTION FRACTION (HFPEF): ICD-10-CM

## 2024-11-07 DIAGNOSIS — E78.2 MIXED HYPERLIPIDEMIA: ICD-10-CM

## 2024-11-07 DIAGNOSIS — I25.10 CORONARY ARTERY DISEASE INVOLVING NATIVE CORONARY ARTERY OF NATIVE HEART, UNSPECIFIED WHETHER ANGINA PRESENT: Primary | ICD-10-CM

## 2024-11-07 RX ORDER — TIRZEPATIDE 7.5 MG/.5ML
1 INJECTION, SOLUTION SUBCUTANEOUS DAILY
COMMUNITY

## 2024-11-07 NOTE — PROGRESS NOTES
CARDIOLOGY INITIAL CONSULT       Chief Complaint  Follow-up and Coronary Artery Disease    Subjective            Harshil Carpenter presents to [facility] CARDIOLOGY  History of Present Illness  The patient comes for cardiovascular evaluation.  He has past medical history significant for severe coronary artery disease with previous myocardial infarction in 2016 treated with PCI.  His last 2D echo was in 2017 which showed normal ejection fraction.  The patient reports progressive shortness of breath and leg swelling.  He reports symptoms for the last few years.  Last office evaluation was over a year ago.  The patient denies angina or palpitations.  He reports that he is not taking aspirin for over the last 6 months. His latest EKG showed normal sinus rhythm with repolarization abnormalities.   Past History:    Medical History:  Past Medical History:   Diagnosis Date    CHF (congestive heart failure)     Coronary artery disease involving native heart without angina pectoris 1/1/2016    with previous myocardial infarction and stent/PCI in 2016    Diabetes mellitus     Hyperlipidemia 10/17/2021    Hypertension, essential 10/17/2021    Myocardial infarction        Surgical History: has a past surgical history that includes Coronary stent placement and Cardiac catheterization.     Family History: family history is not on file.     Social History: reports that he quit smoking about 7 years ago. His smoking use included cigarettes. He has never used smokeless tobacco. Drug use questions deferred to the physician. He reports that he does not drink alcohol.    Allergies: Patient has no active allergies.    Current Outpatient Medications on File Prior to Visit   Medication Sig    Aspirin Low Dose 81 MG EC tablet Take 1 tablet by mouth Daily.    carvedilol (COREG) 6.25 MG tablet Take 1 tablet by mouth 2 (Two) Times a Day.    fluocinolone acetonide (DERMOTIC) 0.01 % oil otic oil Administer 5 each into both  "ears 2 (Two) Times a Day.    fluticasone (FLONASE) 50 MCG/ACT nasal spray 2 sprays into the nostril(s) as directed by provider Daily.    FREESTYLE LITE test strip     gabapentin (NEURONTIN) 800 MG tablet     glucose blood (FREESTYLE LITE) test strip USE TO TEST EVERY MORNING BEFORE BREAKFAST    Lancets (freestyle) lancets     oxyCODONE-acetaminophen (PERCOCET) 7.5-325 MG per tablet     rosuvastatin (CRESTOR) 40 MG tablet Take 1 tablet by mouth Daily.    Tirzepatide (Mounjaro) 7.5 MG/0.5ML solution auto-injector Inject 1 mL under the skin into the appropriate area as directed Daily.    torsemide (DEMADEX) 20 MG tablet TAKE 1 TABLET BY MOUTH TWICE DAILY    Diclofenac Sodium (VOLTAREN) 1 % gel gel Voltaren Arthritis Pain 1 % topical gel   APPLY 2 GRAMS TOPICALLY TO THE AFFECTED AREA FOUR TIMES DAILY AS NEEDED (Patient not taking: Reported on 11/7/2024)    Dulaglutide (Trulicity) 4.5 MG/0.5ML solution pen-injector Inject 4.5 mg under the skin into the appropriate area as directed Daily. (Patient not taking: Reported on 11/7/2024)     No current facility-administered medications on file prior to visit.          Review of Systems : All systems were reviewed and negative except dyspnea and leg swelling.     Objective     /79 (BP Location: Left arm)   Pulse 73   Ht 175.3 cm (69\")   Wt 90.3 kg (199 lb)   BMI 29.39 kg/m²       Physical Exam    Alert, oriented  Neck: No JVD, no bruits  Heart. Regular, no gallops, no rubs.  Lungs. Diminished breath sounds.   Abdomen: Soft, bs +  LE : 1 + edema  Neurologic. No motor deficits.   Result Review :     The following data was reviewed by: Andres Anderson MD on 11/07/2024:        TSH          10/10/2024    07:58   TSH   TSH 2.53          Details          This result is from an external source.             Lipid Panel          3/28/2024    08:00 7/9/2024    09:44 10/18/2024    11:53   Lipid Panel   Total Cholesterol 154     153     92       Triglycerides   43       HDL " Cholesterol 40     39     47       LDL Cholesterol  103.8     102.2     33          Details          This result is from an external source.                Data reviewed: Cardiology studies                     Assessment and Plan        Diagnoses and all orders for this visit:    1. Coronary artery disease involving native coronary artery of native heart, unspecified whether angina present (Primary)  -     Adult Transthoracic Echo Complete W/ Cont if Necessary Per Protocol; Future  -     Stress Test With Myocardial Perfusion One Day; Future    2. Mixed hyperlipidemia    3. Chronic heart failure with preserved ejection fraction (HFpEF)        I would continue with torsemide, lipid lowering agents and carvedilol. Advised and instructed to resume aspirin 81 mg po daily. He will buy it over the counter because doest not wants to be billed by his insurance. I will obtain a 2 DECHO to assess cardiac function and proceed with an exercise Nuclear Stress Test to evaluate for progression of disease and to determine degree of ischemic burden. Continue with  heart healthy lifestyle and diet. He said that walk up to 1-2 miles. Will re-evaluate after the stress test.     I spent 45 minutes caring for Harshil on this date of service. This time includes time spent by me in the following activities:reviewing tests, obtaining and/or reviewing a separately obtained history, performing a medically appropriate examination and/or evaluation , ordering medications, tests, or procedures, and documenting information in the medical record.    Harshil Carpenter  reports that he quit smoking about 7 years ago. His smoking use included cigarettes. He has never used smokeless tobacco. I have educated him on the risk of diseases from using tobacco products such as cancer, COPD and heart disease.     I advised him to quit and he is no longer a tobacco user and has quit.    I spend 5 minutes on counseling the patient.    Follow Up     Return for  After testing, [facility].    Patient was given instructions and counseling regarding his condition or for health maintenance advice. Please see specific information pulled into the AVS if appropriate.

## 2025-01-14 ENCOUNTER — TELEPHONE (OUTPATIENT)
Dept: CARDIOLOGY | Facility: CLINIC | Age: 60
End: 2025-01-14
Payer: MEDICARE

## 2025-01-14 NOTE — TELEPHONE ENCOUNTER
The [facility] received a fax that requires your attention. The document has been indexed to the patient’s chart for your review.      Reason for sending: EXTERNAL MEDICAL RECORD NOTIFICATION     Documents Description: HT MUSCLE IMG SPECT MULT [id number]-3.13.25    Name of Sender: [employer]     Date Indexed: 1.13.25

## 2025-01-20 ENCOUNTER — HOSPITAL ENCOUNTER (OUTPATIENT)
Facility: HOSPITAL | Age: 60
Discharge: HOME | End: 2025-01-20
Payer: MEDICARE

## 2025-01-20 DIAGNOSIS — I25.10 CORONARY ARTERY DISEASE INVOLVING NATIVE CORONARY ARTERY OF NATIVE HEART, UNSPECIFIED WHETHER ANGINA PRESENT: ICD-10-CM

## 2025-01-20 PROCEDURE — 78452 HT MUSCLE IMAGE SPECT MULT: CPT

## 2025-01-20 PROCEDURE — A9502 TC99M TETROFOSMIN: HCPCS | Performed by: INTERNAL MEDICINE

## 2025-01-20 PROCEDURE — 93017 CV STRESS TEST TRACING ONLY: CPT

## 2025-01-20 PROCEDURE — 78452 HT MUSCLE IMAGE SPECT MULT: CPT | Performed by: INTERNAL MEDICINE

## 2025-01-20 PROCEDURE — 34310000005 TECHNETIUM TETROFOSMIN KIT: Performed by: INTERNAL MEDICINE

## 2025-01-20 PROCEDURE — 93016 CV STRESS TEST SUPVJ ONLY: CPT | Performed by: NURSE PRACTITIONER

## 2025-01-20 PROCEDURE — 93018 CV STRESS TEST I&R ONLY: CPT | Performed by: INTERNAL MEDICINE

## 2025-01-20 RX ADMIN — TETROFOSMIN 1 DOSE: 1.38 INJECTION, POWDER, LYOPHILIZED, FOR SOLUTION INTRAVENOUS at 08:05

## 2025-01-20 RX ADMIN — TETROFOSMIN 1 DOSE: 1.38 INJECTION, POWDER, LYOPHILIZED, FOR SOLUTION INTRAVENOUS at 09:30

## 2025-01-21 LAB
BH CV IMMEDIATE POST RECOVERY TECH DATA SYMPTOMS: NORMAL
BH CV IMMEDIATE POST TECH DATA BLOOD PRESSURE: NORMAL MMHG
BH CV IMMEDIATE POST TECH DATA HEART RATE: 122 BPM
BH CV IMMEDIATE POST TECH DATA OXYGEN SATS: 98 %
BH CV REST NUCLEAR ISOTOPE DOSE: 9.9 MCI
BH CV SIX MINUTE RECOVERY TECH DATA BLOOD PRESSURE: NORMAL
BH CV SIX MINUTE RECOVERY TECH DATA HEART RATE: 99 BPM
BH CV SIX MINUTE RECOVERY TECH DATA OXYGEN SATURATION: 98 %
BH CV SIX MINUTE RECOVERY TECH DATA SYMPTOMS: NORMAL
BH CV STRESS BP STAGE 1: NORMAL
BH CV STRESS BP STAGE 2: NORMAL
BH CV STRESS BP STAGE 3: NORMAL
BH CV STRESS DURATION MIN STAGE 1: 3
BH CV STRESS DURATION MIN STAGE 2: 3
BH CV STRESS DURATION MIN STAGE 3: 2
BH CV STRESS DURATION SEC STAGE 1: 0
BH CV STRESS DURATION SEC STAGE 2: 0
BH CV STRESS DURATION SEC STAGE 3: 0
BH CV STRESS GRADE STAGE 1: 10
BH CV STRESS GRADE STAGE 2: 12
BH CV STRESS GRADE STAGE 3: 14
BH CV STRESS HR STAGE 1: 118
BH CV STRESS HR STAGE 2: 128
BH CV STRESS HR STAGE 3: 133
BH CV STRESS METS STAGE 1: 5
BH CV STRESS METS STAGE 2: 7.5
BH CV STRESS METS STAGE 3: 10
BH CV STRESS NUCLEAR ISOTOPE DOSE: 36.6 MCI
BH CV STRESS O2 STAGE 1: 94
BH CV STRESS O2 STAGE 2: 96
BH CV STRESS O2 STAGE 3: 98
BH CV STRESS PROTOCOL 1: NORMAL
BH CV STRESS RECOVERY BP: NORMAL MMHG
BH CV STRESS RECOVERY HR: 94 BPM
BH CV STRESS RECOVERY O2: 98 %
BH CV STRESS SPEED STAGE 1: 1.7
BH CV STRESS SPEED STAGE 2: 2.5
BH CV STRESS SPEED STAGE 3: 3.2
BH CV STRESS STAGE 1: 1
BH CV STRESS STAGE 2: 2
BH CV STRESS STAGE 3: 3
BH CV THREE MINUTE POST TECH DATA BLOOD PRESSURE: NORMAL MMHG
BH CV THREE MINUTE POST TECH DATA HEART RATE: 100 BPM
BH CV THREE MINUTE POST TECH DATA OXYGEN SATURATION: 97 %
MAXIMAL PREDICTED HEART RATE: 160 BPM
PERCENT MAX PREDICTED HR: 84.38 %
SPECT HRT GATED+EF W RNC IV: 64 %
STRESS BASELINE BP: NORMAL MMHG
STRESS BASELINE HR: 85 BPM
STRESS O2 SAT REST: 96 %
STRESS PERCENT HR: 99 %
STRESS POST ESTIMATED WORKLOAD: 8.4 METS
STRESS POST EXERCISE DUR MIN: 7 MIN
STRESS POST EXERCISE DUR SEC: 46 SEC
STRESS POST O2 SAT PEAK: 98 %
STRESS POST PEAK BP: NORMAL MMHG
STRESS POST PEAK HR: 135 BPM
STRESS TARGET HR: 136 BPM

## 2025-04-01 ENCOUNTER — OFFICE VISIT (OUTPATIENT)
Dept: ORTHOPEDIC SURGERY | Facility: CLINIC | Age: 60
End: 2025-04-01
Payer: MEDICARE

## 2025-04-01 VITALS — WEIGHT: 199 LBS | HEIGHT: 69 IN | BODY MASS INDEX: 29.47 KG/M2

## 2025-04-01 DIAGNOSIS — M19.011 ARTHRITIS OF RIGHT ACROMIOCLAVICULAR JOINT: ICD-10-CM

## 2025-04-01 DIAGNOSIS — M25.511 RIGHT SHOULDER PAIN, UNSPECIFIED CHRONICITY: Primary | ICD-10-CM

## 2025-04-01 RX ORDER — DICLOFENAC SODIUM 75 MG/1
75 TABLET, DELAYED RELEASE ORAL 2 TIMES DAILY
Qty: 60 TABLET | Refills: 1 | Status: SHIPPED | OUTPATIENT
Start: 2025-04-01

## 2025-04-01 NOTE — PROGRESS NOTES
"Chief Complaint  Initial Evaluation of the Right Shoulder     Subjective      Harshil Carpenter presents to [facility] for initial evaluation of the right shoulder.  He has had pain for a few months.  He lays on his side for sleeping.  He has swelling and pain in the shoulder and down the arm.  He has pain in the shoulder. He has to rotate his arm in and out and hears a pop and then can raise his arm up.  He asked his Dr for a higher pain medication and noted his PCP couldn't do that.  He has had injections in the past in the shoulder.      No Known Allergies     Social History     Socioeconomic History    Marital status: Single   Tobacco Use    Smoking status: Former     Current packs/day: 0.00     Types: Cigarettes     Quit date: 10/18/2017     Years since quittin.4    Smokeless tobacco: Never    Tobacco comments:     CONTINUES WITH SMPKING CESSATION   Vaping Use    Vaping status: Never Used   Substance and Sexual Activity    Alcohol use: Never    Drug use: Defer    Sexual activity: Defer        I reviewed the patient's chief complaint, history of present illness, review of systems, past medical history, surgical history, family history, social history, medications, and allergy list.     Review of Systems     Constitutional: Denies fevers, chills, weight loss  Cardiovascular: Denies chest pain, shortness of breath  Skin: Denies rashes, acute skin changes  Neurologic: Denies headache, loss of consciousness        Vital Signs:   Ht 175.3 cm (69\")   Wt 90.3 kg (199 lb)   BMI 29.39 kg/m²          Physical Exam  General: Alert. No acute distress    Ortho Exam        RIGHT SHOULDER Forward flexion 90 then pops and cracks and can get to 160 with pain. Abduction 80. External rotation 40. Internal rotation SI joint. Positive Cross body adduction. Supraspinatus strength 3+/5. Infraspinatus Strength 4/5. Infrared subscap 4/5. Positive Gomes. Positive Neer. Negative Apprehension. Negative " Lift off. (Negative Obriens. Sensation intact to light touch, median, radial, ulnar nerve. Positive AIN, PIN, ulnar nerve motor. Positive pulses. Positive Impingement signs. Good strength in triceps, biceps, deltoid, wrist extensors and wrist flexors. Tender to palpation to the anterior aspect of the shoulder an d down the arm.  Pain with empty can testing.         Procedures      Imaging Results (Most Recent)       Procedure Component Value Units Date/Time    XR Scapula Right [[id number]] Resulted: 04/01/25 1455     Updated: 04/01/25 1458             Result Review :     X-Ray Report:  Right scapula X-Ray  Indication: Evaluation of the right scapula  AP/Lateral view(s)  Findings: Moderate AC joint arthritis.    Prior studies available for comparison: no        Assessment and Plan     Diagnoses and all orders for this visit:    1. Right shoulder pain, unspecified chronicity (Primary)  -     XR Scapula Right    2. Arthritis of right acromioclavicular joint        Discussed the treatment plan with the patient. I reviewed the X-rays that were obtained today with the patient.     MRI of the right shoulder to assess the structure due to decrease ROM of the shoulder.      He uses arthritis cream from the [employer].  Prescribed anti inflammatory.        Call or return if worsening symptoms.    Follow Up     After MRI of the right shoulder.        Patient was given instructions and counseling regarding his condition or for health maintenance advice. Please see specific information pulled into the AVS if appropriate.     Scribed for Campbell Mcdaniel MD by Rylee Melendez MA.  04/01/25   14:54 EDT    I have personally performed the services described in this document as scribed by the above individual and it is both accurate and complete. Campbell Mcdaniel MD 04/02/25

## 2025-05-22 DIAGNOSIS — M19.011 ARTHRITIS OF RIGHT ACROMIOCLAVICULAR JOINT: ICD-10-CM

## 2025-05-22 RX ORDER — DICLOFENAC SODIUM 75 MG/1
75 TABLET, DELAYED RELEASE ORAL 2 TIMES DAILY
Qty: 60 TABLET | Refills: 1 | Status: SHIPPED | OUTPATIENT
Start: 2025-05-22

## 2025-07-18 DIAGNOSIS — M19.011 ARTHRITIS OF RIGHT ACROMIOCLAVICULAR JOINT: ICD-10-CM

## 2025-07-21 RX ORDER — DICLOFENAC SODIUM 75 MG/1
75 TABLET, DELAYED RELEASE ORAL 2 TIMES DAILY
Qty: 60 TABLET | Refills: 1 | Status: SHIPPED | OUTPATIENT
Start: 2025-07-21

## 2025-08-21 ENCOUNTER — HOSPITAL ENCOUNTER (OUTPATIENT)
Dept: MRI IMAGING | Facility: HOSPITAL | Age: 60
Discharge: HOME | End: 2025-08-21
Admitting: ORTHOPAEDIC SURGERY
Payer: MEDICARE

## 2025-08-21 DIAGNOSIS — M19.011 ARTHRITIS OF RIGHT ACROMIOCLAVICULAR JOINT: ICD-10-CM

## 2025-08-21 PROCEDURE — 73221 MRI JOINT UPR EXTREM W/O DYE: CPT

## 2025-08-27 ENCOUNTER — TELEPHONE (OUTPATIENT)
Dept: ORTHOPEDIC SURGERY | Facility: CLINIC | Age: 60
End: 2025-08-27
Payer: MEDICARE